# Patient Record
Sex: MALE | Race: WHITE
[De-identification: names, ages, dates, MRNs, and addresses within clinical notes are randomized per-mention and may not be internally consistent; named-entity substitution may affect disease eponyms.]

---

## 2019-10-02 ENCOUNTER — HOSPITAL ENCOUNTER (OUTPATIENT)
Dept: HOSPITAL 25 - ED | Age: 54
Setting detail: OBSERVATION
LOS: 1 days | Discharge: HOME | End: 2019-10-03
Attending: INTERNAL MEDICINE | Admitting: INTERNAL MEDICINE
Payer: COMMERCIAL

## 2019-10-02 ENCOUNTER — HOSPITAL ENCOUNTER (EMERGENCY)
Dept: HOSPITAL 25 - ED | Age: 54
Discharge: HOME | End: 2019-10-02
Payer: COMMERCIAL

## 2019-10-02 VITALS — DIASTOLIC BLOOD PRESSURE: 88 MMHG | SYSTOLIC BLOOD PRESSURE: 149 MMHG

## 2019-10-02 DIAGNOSIS — R07.89: Primary | ICD-10-CM

## 2019-10-02 DIAGNOSIS — R00.2: ICD-10-CM

## 2019-10-02 DIAGNOSIS — E78.00: ICD-10-CM

## 2019-10-02 DIAGNOSIS — R07.9: Primary | ICD-10-CM

## 2019-10-02 DIAGNOSIS — Z79.899: ICD-10-CM

## 2019-10-02 DIAGNOSIS — I25.10: ICD-10-CM

## 2019-10-02 DIAGNOSIS — E78.5: ICD-10-CM

## 2019-10-02 DIAGNOSIS — Z88.8: ICD-10-CM

## 2019-10-02 DIAGNOSIS — I49.49: ICD-10-CM

## 2019-10-02 DIAGNOSIS — R10.9: ICD-10-CM

## 2019-10-02 DIAGNOSIS — Z87.891: ICD-10-CM

## 2019-10-02 DIAGNOSIS — R11.10: ICD-10-CM

## 2019-10-02 LAB
ALBUMIN SERPL BCG-MCNC: 4.9 G/DL (ref 3.2–5.2)
ALBUMIN/GLOB SERPL: 1.9 {RATIO} (ref 1–3)
ALP SERPL-CCNC: 55 U/L (ref 34–104)
ALT SERPL W P-5'-P-CCNC: 22 U/L (ref 7–52)
ANION GAP SERPL CALC-SCNC: 4 MMOL/L (ref 2–11)
AST SERPL-CCNC: 22 U/L (ref 13–39)
BASOPHILS # BLD AUTO: 0 10^3/UL (ref 0–0.2)
BUN SERPL-MCNC: 14 MG/DL (ref 6–24)
BUN/CREAT SERPL: 17.3 (ref 8–20)
CALCIUM SERPL-MCNC: 10 MG/DL (ref 8.6–10.3)
CHLORIDE SERPL-SCNC: 105 MMOL/L (ref 101–111)
CK MB SERPL-MCNC: 1.7 NG/ML (ref 0.6–6.3)
CK MB SERPL-MCNC: 2 NG/ML (ref 0.6–6.3)
CK SERPL-CCNC: 115 U/L (ref 10–223)
EOSINOPHIL # BLD AUTO: 0.1 10^3/UL (ref 0–0.6)
GLOBULIN SER CALC-MCNC: 2.6 G/DL (ref 2–4)
GLUCOSE SERPL-MCNC: 82 MG/DL (ref 70–100)
HCO3 SERPL-SCNC: 30 MMOL/L (ref 22–32)
HCT VFR BLD AUTO: 45 % (ref 42–52)
HGB BLD-MCNC: 15.4 G/DL (ref 14–18)
INR PPP/BLD: 0.97 (ref 0.82–1.09)
LYMPHOCYTES # BLD AUTO: 1.9 10^3/UL (ref 1–4.8)
MAGNESIUM SERPL-MCNC: 2.3 MG/DL (ref 1.9–2.7)
MCH RBC QN AUTO: 32 PG (ref 27–31)
MCHC RBC AUTO-ENTMCNC: 34 G/DL (ref 31–36)
MCV RBC AUTO: 94 FL (ref 80–94)
MONOCYTES # BLD AUTO: 0.6 10^3/UL (ref 0–0.8)
NEUTROPHILS # BLD AUTO: 3.3 10^3/UL (ref 1.5–7.7)
NRBC # BLD AUTO: 0 10^3/UL
NRBC BLD QL AUTO: 0.1
PLATELET # BLD AUTO: 286 10^3/UL (ref 150–450)
POTASSIUM SERPL-SCNC: 4 MMOL/L (ref 3.5–5)
PROT SERPL-MCNC: 7.5 G/DL (ref 6.4–8.9)
RBC # BLD AUTO: 4.81 10^6 /UL (ref 4.18–5.48)
SODIUM SERPL-SCNC: 139 MMOL/L (ref 135–145)
TROPONIN I SERPL-MCNC: 0 NG/ML (ref ?–0.04)
TROPONIN I SERPL-MCNC: 0 NG/ML (ref ?–0.04)
WBC # BLD AUTO: 6 10^3/UL (ref 3.5–10.8)

## 2019-10-02 PROCEDURE — 82553 CREATINE MB FRACTION: CPT

## 2019-10-02 PROCEDURE — 85025 COMPLETE CBC W/AUTO DIFF WBC: CPT

## 2019-10-02 PROCEDURE — 93306 TTE W/DOPPLER COMPLETE: CPT

## 2019-10-02 PROCEDURE — 93017 CV STRESS TEST TRACING ONLY: CPT

## 2019-10-02 PROCEDURE — 80061 LIPID PANEL: CPT

## 2019-10-02 PROCEDURE — 78452 HT MUSCLE IMAGE SPECT MULT: CPT

## 2019-10-02 PROCEDURE — 82550 ASSAY OF CK (CPK): CPT

## 2019-10-02 PROCEDURE — 80048 BASIC METABOLIC PNL TOTAL CA: CPT

## 2019-10-02 PROCEDURE — 90686 IIV4 VACC NO PRSV 0.5 ML IM: CPT

## 2019-10-02 PROCEDURE — 83735 ASSAY OF MAGNESIUM: CPT

## 2019-10-02 PROCEDURE — 36415 COLL VENOUS BLD VENIPUNCTURE: CPT

## 2019-10-02 PROCEDURE — G0008 ADMIN INFLUENZA VIRUS VAC: HCPCS

## 2019-10-02 PROCEDURE — 84484 ASSAY OF TROPONIN QUANT: CPT

## 2019-10-02 PROCEDURE — 85610 PROTHROMBIN TIME: CPT

## 2019-10-02 PROCEDURE — 83880 ASSAY OF NATRIURETIC PEPTIDE: CPT

## 2019-10-02 PROCEDURE — 93005 ELECTROCARDIOGRAM TRACING: CPT

## 2019-10-02 PROCEDURE — 99284 EMERGENCY DEPT VISIT MOD MDM: CPT

## 2019-10-02 PROCEDURE — 90471 IMMUNIZATION ADMIN: CPT

## 2019-10-02 PROCEDURE — 80053 COMPREHEN METABOLIC PANEL: CPT

## 2019-10-02 PROCEDURE — 71046 X-RAY EXAM CHEST 2 VIEWS: CPT

## 2019-10-02 PROCEDURE — 84439 ASSAY OF FREE THYROXINE: CPT

## 2019-10-02 PROCEDURE — 83036 HEMOGLOBIN GLYCOSYLATED A1C: CPT

## 2019-10-02 PROCEDURE — G0378 HOSPITAL OBSERVATION PER HR: HCPCS

## 2019-10-02 PROCEDURE — 84443 ASSAY THYROID STIM HORMONE: CPT

## 2019-10-02 PROCEDURE — 99283 EMERGENCY DEPT VISIT LOW MDM: CPT

## 2019-10-02 PROCEDURE — A9502 TC99M TETROFOSMIN: HCPCS

## 2019-10-02 NOTE — ED
HPI Chest Pain





- HPI Summary


HPI Summary: 


Patient is a 53 y/o M presenting to [facility] for evaluation of chest pain. He was 

evaluated by Dr. Jin earlier today, 10/02/19 and was discharged to home. He 

was called back due to abnormal EKG findings. During the time of the phone call

, he had reported that he had been having chest pressure intermittently still 

that was worse with exertion. While in ED, he reports no chest pain but states 

he has mild abdominal discomfort. Patient to be admitted for in-patient stress 

test. Home medications and allergies are reviewed. 





- History of Current Complaint


Chief Complaint: EDGeneral


Time Seen by Provider: 10/02/19 19:26


Hx Obtained From: Patient


Onset/Duration: Resolved


Timing: Intermittent


Current Severity: None


Pain Intensity: 0


Pain Scale Used: 0-10 Numeric


Associated Signs and Symptoms: Positive: Chest Pain, Abdominal Pain





- Allergy/Home Medications


Allergies/Adverse Reactions: 


 Allergies











Allergy/AdvReac Type Severity Reaction Status Date / Time


 


simvastatin Allergy  Altered Verified 10/02/19 16:24





   Mental  





   Status  











Home Medications: 


 Home Medications





Tadalafil [Cialis] 5 mg PO DAILY PRN 10/02/19 [History Confirmed 10/03/19]











PMH/Surg Hx/FS Hx/Imm Hx


Cardiovascular History: Reports: Hx Hypercholesterolemia, Other Cardiovascular 

Problems/Disorders - ectopic heart beats


Sensory History: 


   Denies: Hx Legally Blind, Hx Deafness


Opthamlomology History: 


   Denies: Hx Legally Blind


Infectious Disease History: No


Infectious Disease History: 


   Denies: Traveled Outside the [place] in Last 30 Days





- Family History


Known Family History: 


   Negative: Blood Disorder





- Social History


Alcohol Use: Rare


Substance Use Type: Reports: None


Smoking Status (MU): Former Smoker





Review of Systems


Positive: Chest Pain


Positive: Abdominal Pain


All Other Systems Reviewed And Are Negative: Yes





Physical Exam





- Summary


Physical Exam Summary: 


VITAL SIGNS: Reviewed.


GENERAL: Patient is a well-developed and nourished MALE who is lying 

comfortable in the stretcher. Patient is not in any acute respiratory distress.


HEAD AND FACE: No signs of trauma. No ecchymosis, hematomas or skull 

depressions. No sinus tenderness.


EYES: PERRLA, EOMI x 2, No injected conjunctiva, no nystagmus.


EARS: Hearing grossly intact. Ear canals and tympanic membranes are within 

normal limits.


MOUTH: Oropharynx within normal limits.


NECK: Supple, trachea is midline, no adenopathy, no JVD, no carotid bruit, no c-

spine tenderness, neck with full ROM.


CHEST: Symmetric, no tenderness at palpation.


LUNGS: Clear to auscultation bilaterally. No wheezing or crackles.


CVS: Regular rate and rhythm, S1 and S2 present, no murmurs or gallops 

appreciated.


ABDOMEN: Soft, non-tender. No signs of distention. No rebound, no guarding, and 

no masses palpated. Bowel sounds are normal.


EXTREMITIES: FROM in all major joints, no edema, no cyanosis or clubbing.


NEURO: Alert and oriented x 3. No acute neurological deficits. Speech is normal 

and follows commands.


SKIN: Dry and warm.








Triage Information Reviewed: Yes


Vital Signs On Initial Exam: 


 Initial Vitals











Temp Pulse Resp BP Pulse Ox


 


 98.4 F   77   18   162/91   99 


 


 10/02/19 16:21  10/02/19 16:21  10/02/19 16:21  10/02/19 16:21  10/02/19 16:21











Vital Signs Reviewed: Yes





Procedures





- Sedation


Patient Received Moderate/Deep Sedation with Procedure: No





Diagnostics





- Vital Signs


 Vital Signs











  Temp Pulse Resp BP Pulse Ox


 


 10/02/19 18:10  98.2 F  74  17  150/86  100


 


 10/02/19 16:21  98.4 F  77  18  162/91  99














- Laboratory


Result Diagrams: 


 10/03/19 05:34





 10/03/19 05:34


Lab Statement: Any lab studies that have been ordered have been reviewed, and 

results considered in the medical decision making process.





- EKG


  ** 2011


Cardiac Rate: NL - rate of 68 BPM


EKG Rhythm: Sinus Rhythm


Summary of EKG Findings: EKG showed NSR with rate of 68 BPM, ST depressions in 

lead I - III aVF, V3-V6. This EKG was reviewed and interpreted by ED physician.





Chest Pain Course/Dx





- Course


Assessment/Plan: This patient is a 54-year-old male who I recalled to return to 

the emergency room since the patients second EKG was abnormal.  The patient 

continues to be asymptomatic, he denies any chest pains and the first 2 

troponins were negative.  The EKG he was a normal sinus rhythm at 81 bpm with 

ST depression in leads 1-3 and aVF and V3 to V6.  The patient was given 

Lopressor and aspirin. I held the Nitroglycerin since he takes Cialis.  I 

discussed the findings and the EKG with Dr. Milton who reported that Dr. Ortiz 

is going to consult for this patient tomorrow morning.  I also discussed case 

with Dr. Godwin from the hospital services who accepted the patient for 

admission.  Repeat troponin 0.00 and CKMB 1.7





- Diagnoses


Provider Diagnoses: 


 Angina pectoris, EKG abnormality








- Provider Notifications


Discussed Care Of Patient With: Deyvi Godwin


Time Discussed With Above Provider: 19:31


Instructed by Provider To: Other - Patient's case was discussed with Dr. Godwin, 

who accepts the patient for admission. Cardiology consult to be obtained. 1934 

- Patient's case was discussed with Dr. Milton, Dr. Milton reports that the 

patient will be evaluated by Dr. Ortiz in the morning.





Discharge ED





- Sign-Out/Discharge


Documenting (check all that apply): Patient Departure - admit





- Discharge Plan


Condition: Stable


Disposition: ADMITTED TO [facility]





- Billing Disposition and Condition


Condition: STABLE


Disposition: Admitted to [facility]





- Attestation Statements


Document Initiated by Jimibe: Yes


Documenting Scribe: JET NARANJO


Provider For Whom Steven is Documenting (Include Credential): CLAUDIA JIN MD


Scribe Attestation: 


I, JET NARANJO, scribed for CLAUDIA JIN MD on 10/04/19 at 0916. 


Scribe Documentation Reviewed: Yes


Provider Attestation: 


The documentation as recorded by the JET mares accurately reflects 

the service I personally performed and the decisions made by me, CLAUDIA JIN MD


Status of Scribe Document: Viewed

## 2019-10-02 NOTE — ED
Complex/Multi-Sys Presentation





- HPI Summary


HPI Summary: 





This patient is a 54 year old M presenting to [facility] with a chief complaint of 

chest pain and palpitations since last night. The Sx lasted for 2 minutes. He 

denies these Sx at present. The patient rates the pain 0/10 in severity. 

Symptoms aggravated by laying down. Symptoms alleviated by standing up.


Patient reports nausea, congestion. Patient denies dizziness, SOB, near-

syncope. Pt has hx of ectopic heart beats and HLD. Pt has not had any previous 

surgeries. He no longer smokes, but drinks alcohol occasionally and uses 

recreational drugs.








- History Of Current Complaint


Chief Complaint: EDChestWallPain


Time Seen by Provider: 10/02/19 11:09


Hx Obtained From: Patient


Onset/Duration: Sudden Onset, Lasting Days - 1


Severity Currently: Mild


Severity Initially: Mild


Aggravating Factor(s): laying down


Alleviating Factor(s): standing up


Associated Signs And Symptoms: Positive: Chest Pain - since resolved, 

Palpitations - since resolved, Nausea, Other - positive - congestion; negative 

- dizziness, near-syncope..  Negative: SOB





- Allergies/Home Medications


Allergies/Adverse Reactions: 


 Allergies











Allergy/AdvReac Type Severity Reaction Status Date / Time


 


simvastatin Allergy  Altered Verified 10/02/19 16:24





   Mental  





   Status  














PMH/Surg Hx/FS Hx/Imm Hx


Cardiovascular History: Reports: Hx Hypercholesterolemia, Other Cardiovascular 

Problems/Disorders - ectopic heart beats


Sensory History: 


   Denies: Hx Legally Blind, Hx Deafness


Opthamlomology History: 


   Denies: Hx Legally Blind


EENT History: 


   Denies: Hx Deafness


Infectious Disease History: No


Infectious Disease History: 


   Denies: Traveled Outside the [place] in Last 30 Days





- Family History


Known Family History: 


   Negative: Blood Disorder





- Social History


Alcohol Use: Rare


Substance Use Type: Reports: None


Smoking Status (MU): Former Smoker





Review of Systems


Positive: Palpitations, Chest Pain


Respiratory: Other - positive - congestion 


Negative: Shortness Of Breath


Positive: Nausea


Positive: Myalgia - torso pain 


Neurological: Other - negative - dizziness 


Negative: Syncope - near


All Other Systems Reviewed And Are Negative: Yes





Physical Exam





- Summary


Physical Exam Summary: 





VITAL SIGNS: Reviewed.


GENERAL: Patient is a well-developed and nourished MALE who is lying 

comfortable in the stretcher. Patient is not in any acute respiratory distress.


HEAD AND FACE: No signs of trauma. No ecchymosis, hematomas or skull 

depressions. No sinus tenderness.


EYES: PERRLA, EOMI x 2, No injected conjunctiva, no nystagmus.


EARS: Hearing grossly intact. Ear canals and tympanic membranes are within 

normal limits.


MOUTH: Oropharynx within normal limits.


NECK: Supple, trachea is midline, no adenopathy, no JVD, no carotid bruit, no c-

spine tenderness, neck with full ROM.


CHEST: Symmetric, no tenderness at palpation.


LUNGS: Clear to auscultation bilaterally. No wheezing or crackles.


CVS: Regular rate and rhythm, S1 and S2 present, no murmurs or gallops 

appreciated.


ABDOMEN: Soft, non-tender. No signs of distention. No rebound, no guarding, and 

no masses palpated. Bowel sounds are normal.


EXTREMITIES: FROM in all major joints, no edema, no cyanosis or clubbing.


NEURO: Alert and oriented x 3. No acute neurological deficits. Speech is normal 

and follows commands.


SKIN: Dry and warm.





Triage Information Reviewed: Yes


Vital Signs On Initial Exam: 


 Initial Vitals











Temp Pulse Resp BP Pulse Ox


 


 98.7 F   84   18   137/75   100 


 


 10/02/19 09:48  10/02/19 09:48  10/02/19 09:48  10/02/19 09:48  10/02/19 09:48











Vital Signs Reviewed: Yes





Procedures





- Sedation


Patient Received Moderate/Deep Sedation with Procedure: No





Diagnostics





- Vital Signs


 Vital Signs











  Temp Pulse Resp BP Pulse Ox


 


 10/02/19 09:48  98.7 F  84  18  137/75  100














- Laboratory


Lab Results: 


 Lab Results











  10/02/19 10/02/19 Range/Units





  11:01 11:01 


 


WBC  6.0   (3.5-10.8)  10^3/uL


 


RBC  4.81   (4.18-5.48)  10^6 /uL


 


Hgb  15.4   (14.0-18.0)  g/dL


 


Hct  45   (42-52)  %


 


MCV  94   (80-94)  fL


 


MCH  32 H   (27-31)  pg


 


MCHC  34   (31-36)  g/dL


 


RDW  13   (10-15)  %


 


Plt Count  286   (150-450)  10^3/uL


 


MPV  8.2   (7.4-10.4)  fL


 


Neut % (Auto)  55.6   %


 


Lymph % (Auto)  32.3   %


 


Mono % (Auto)  9.4   %


 


Eos % (Auto)  2.2   %


 


Baso % (Auto)  0.5   %


 


Absolute Neuts (auto)  3.3   (1.5-7.7)  10^3/ul


 


Absolute Lymphs (auto)  1.9   (1.0-4.8)  10^3/ul


 


Absolute Monos (auto)  0.6   (0-0.8)  10^3/ul


 


Absolute Eos (auto)  0.1   (0-0.6)  10^3/ul


 


Absolute Basos (auto)  0.0   (0-0.2)  10^3/ul


 


Absolute Nucleated RBC  0.0   10^3/ul


 


Nucleated RBC %  0.1   


 


INR (Anticoag Therapy)   0.97  (0.82-1.09)  











Result Diagrams: 


 10/02/19 11:01





 10/02/19 11:01


Lab Statement: Any lab studies that have been ordered have been reviewed, and 

results considered in the medical decision making process.





- Radiology


  ** CXR


Radiology Interpretation Completed By: Radiologist


Summary of Radiographic Findings: IMPRESSION:  #. No evidence for acute 

intrathoracic disease.  THIS REPORT WAS REVIEWED BY DR. JIN.





- EKG


  ** 1119


Cardiac Rate: NL - rate of 69 BPM


EKG Rhythm: Sinus Rhythm


Summary of EKG Findings: EKG showed NSR with rate of 69 BPM, no ST elevation, 

normal axis. EKG has been reviewed and interpreted by ED physician.





Re-Evaluation





- Re-Evaluation


  ** First Eval


Re-Evaluation Time: 13:44


Comment: I discussed all the findings and test results with the patient. 

Patient was instructed to return to the emergency room immediately if any of 

the symptoms return or worsen. Plan of care was discussed with the patient and 

understands and agrees. All questions were answered at patient satisfaction.  

There were no further complaints or concerns.  Lung exam before discharge: CTA B

/L. Good air exchange. No wheezing or crackles heard. CVS: S1 and S2 present. 

No murmurs appreciated. Patient is alert and oriented x 3. Patient is 

hemodynamically stable. Patient will be discharged home with follow up PCP in 

the next 2-3 days





Complex Multi-Symp Course/Dx


Course Of Treatment: This patient is a 54-year-old male who presents to the 

emergency department with a chief complaint of having chest pain and 

palpitations for the last couple weeks.  In the ED the patient reports no 

palpitations or chest pain.  EKG is a sinus rhythm at 69 bpm without any ST 

elevations.  Chest x-ray impression: No evidence for acute intrathoracic 

disease.  Blood work without any significant abnormality except for TSH of 5.6.

  Troponin is 0.00.  The patient continues to be asymptomatic.  Second troponin 

is 0.00.  Heart score is 2 and LU score is 1. Therefore low suspicion for 

ACS.  He is not tachycardic or hypoxic thus low suspicion for PE.  I believe 

that the patient would benefit from Holter monitor.  I discussed all the 

findings and test results with the patient. Patient was instructed to return to 

the emergency room immediately if any of the symptoms return or worsen. Plan of 

care was discussed with the patient and understands and agrees. All questions 

were answered at patient satisfaction.  There were no further complaints or 

concerns.  Lung exam before discharge: CTA B/L. Good air exchange. No wheezing 

or crackles heard. CVS: S1 and S2 present. No murmurs appreciated. Patient is 

alert and oriented x 3. Patient is hemodynamically stable. Patient will be 

discharged home with follow up PCP in the next 2-3 days


Assessment/Plan: Right after the patient was discharged I noticed that the 

patient had another EKG which shows some ST depressions.  Therefore I recall 

the patient to return to the emergency department.  The patient reports that he 

will return in the couple hours.





- Diagnoses


Provider Diagnoses: 


 Atypical chest pain, Palpitations, Chest pain








Discharge ED





- Sign-Out/Discharge


Documenting (check all that apply): Patient Departure - discharge





- Discharge Plan


Condition: Stable


Disposition: HOME


Patient Education Materials:  Chest Pain (ED), Heart Palpitations (ED)


Referrals: 


Jose A Alston MD [Primary Care Provider] - 3 Days


Additional Instructions: 


PLEASE RETURN TO ED FOR ANY NEW OR WORSENING SYMPTOMS. PLEASE FOLLOW UP WITH 

YOUR PRIMARY CARE PHYSICIAN WITHIN THREE DAYS. 





- Billing Disposition and Condition


Condition: STABLE


Disposition: Home





- Attestation Statements


Document Initiated by Scribe: Yes


Documenting Scribe: JET SPARKS


Provider For Whom Scribe is Documenting (Include Credential): CLAUDIA JIN MD


Scribe Attestation: 


JET DIMAS AND DAYANNA SPRAKS, scribed for CLAUDIA JIN MD on 10/04/19 at 

0917. 


Scribe Documentation Reviewed: Yes


Provider Attestation: 


The documentation as recorded by the scribe, JET NARANJO AND DAYANNA SPARKS 

accurately reflects the service I personally performed and the decisions made 

by me, CLAUDIA JIN MD


Status of Scribe Document: Viewed

## 2019-10-03 VITALS — DIASTOLIC BLOOD PRESSURE: 68 MMHG | SYSTOLIC BLOOD PRESSURE: 118 MMHG

## 2019-10-03 LAB
ANION GAP SERPL CALC-SCNC: 8 MMOL/L (ref 2–11)
BASOPHILS # BLD AUTO: 0 10^3/UL (ref 0–0.2)
BUN SERPL-MCNC: 16 MG/DL (ref 6–24)
BUN/CREAT SERPL: 18.2 (ref 8–20)
CALCIUM SERPL-MCNC: 9.3 MG/DL (ref 8.6–10.3)
CHLORIDE SERPL-SCNC: 105 MMOL/L (ref 101–111)
CHOLEST SERPL-MCNC: 263 MG/DL
EOSINOPHIL # BLD AUTO: 0.1 10^3/UL (ref 0–0.6)
GLUCOSE SERPL-MCNC: 92 MG/DL (ref 70–100)
HCO3 SERPL-SCNC: 25 MMOL/L (ref 22–32)
HCT VFR BLD AUTO: 44 % (ref 42–52)
HDLC SERPL-MCNC: 41.1 MG/DL
HGB BLD-MCNC: 14.9 G/DL (ref 14–18)
LYMPHOCYTES # BLD AUTO: 3 10^3/UL (ref 1–4.8)
MCH RBC QN AUTO: 32 PG (ref 27–31)
MCHC RBC AUTO-ENTMCNC: 34 G/DL (ref 31–36)
MCV RBC AUTO: 94 FL (ref 80–94)
MONOCYTES # BLD AUTO: 0.7 10^3/UL (ref 0–0.8)
NEUTROPHILS # BLD AUTO: 3.9 10^3/UL (ref 1.5–7.7)
NRBC # BLD AUTO: 0 10^3/UL
NRBC BLD QL AUTO: 0.1
PLATELET # BLD AUTO: 270 10^3/UL (ref 150–450)
POTASSIUM SERPL-SCNC: 3.5 MMOL/L (ref 3.5–5)
RBC # BLD AUTO: 4.7 10^6 /UL (ref 4.18–5.48)
SODIUM SERPL-SCNC: 138 MMOL/L (ref 135–145)
TRIGL SERPL-MCNC: 147 MG/DL
TROPONIN I SERPL-MCNC: 0.01 NG/ML (ref ?–0.04)
WBC # BLD AUTO: 7.8 10^3/UL (ref 3.5–10.8)

## 2019-10-03 NOTE — ECHO
*[facility]*

[place] [address]

Phone: [phone]

Fax #: [phone]



-------------------------------------------------------------------

Transthoracic Echocardiogram



Patient: Lambert Lomas              MRN:        [id number]

:     1965                         Study Date: 10/03/2019

Age:     54                                 Accession#: [id number]

Gender:  M                                  HR:         53 bpm

Height:  70 in /177.8 cm                    BSA:        1.95 m^2

Weight:  169.6 lb /77.1 kg                  BMI:        24.4 kg/m^2



*Sonographer: * Sherrie Mckinney RDCS RN

 

*Referring Physician: * Deyvi Godwin

*Reading Physician: * Anibal Ortiz MD



-------------------------------------------------------------------

Indications:   Chest Pain, unspecified.



-------------------------------------------------------------------

History:   Risk factors:   Former tobacco use. Hypertension.

Dyslipidemia.



-------------------------------------------------------------------

Conclusions



Summary:



- Left ventricle: The cavity size is normal. Wall thickness is at

  the upper limits of normal. Systolic function is normal. The

  estimated ejection fraction is 55-60%. Wall motion is normal;

  there are no regional wall motion abnormalities.

- Normal cardiac chamber sizes.

- Functionally benign heart valves.

- There is no prior echocardiogram available to compare with at

  this time.



-------------------------------------------------------------------

Study data:  Transthoracic echocardiogram.  Procedure:

Transthoracic echocardiography was performed. Image quality was

fair.  Complete 2D, spectral Doppler, and color flow Doppler.

Location:  Bedside.  Patient status:  Observation. Patient room

number: 444-02.  Rhythm:  Bradycardia.



-------------------------------------------------------------------

Findings



Left ventricle:  The cavity size is normal. Wall thickness is at

the upper limits of normal. Systolic function is normal. The

estimated ejection fraction is 55-60%. Wall motion is normal; there

are no regional wall motion abnormalities. Left ventricular

diastolic function parameters are normal.

Right ventricle:  The cavity size is normal. Systolic function is

normal.

Left atrium:  The atrium is normal in size.

Right atrium:  The atrium is normal in size.

Mitral valve:  The leaflets are mildly thickened. There is trace

regurgitation.

Aortic valve:   The valve is trileaflet. The leaflets are mildly

thickened.  There is no evidence of stenosis.   There is no

regurgitation.

Tricuspid valve:   The valve is structurally normal.   There is

trace regurgitation.

Pulmonic valve:    The valve is structurally normal.    There is no

evidence of stenosis.   There is no regurgitation.

Aorta:  Aortic root: The aortic root is appears normal.

Ascending aorta: The ascending aorta is not dilated.

Aortic arch: The aortic arch is not dilated.

Pericardium:  There is no pericardial effusion.

Pulmonary arteries:

The main pulmonary artery is normal-sized.  Systolic pressure can

not be accurately estimated.

Systemic veins:

Inferior vena cava: The vessel is normal in size. There is (&gt;= 50%)

respiratory change in the IVC dimension.



-------------------------------------------------------------------

Measurements



 Left ventricle            Value        Ref         Aortic valve               Value       Ref

 VANDANA, LAX          (L)     4.0   cm     4.2 - 5.8   Jennifer diam, ED               2.1   cm    ----

 ESD, LAX                  2.9   cm     2.5 - 4.0   Peak v, S                  1.22  m/sec ----

 FS, LAX                   28    %      25 - 43     VTI, S                     28.0  cm    ----

 PW, ED            (H)     1.1   cm     0.6 - 1.0   Mean grad, S               3.0   mm Hg ----

 IVS/PW, ED                0.95         ----------  Peak grad, S               6.0   mm Hg ----

 E&apos;, lat jennifer, TDI          10.3  cm/sec &gt;=10.0      LVOT/AV, VTI ratio         0.69        --
--

 E/e&apos;, lat jennifer,            8            ----------

 TDI                                                Mitral valve               Value       Ref

 E&apos;, med jennifer, TDI          9.4   cm/sec &gt;=7.0       Peak E                     0.86  m/sec --
--

 E/e&apos;, med jennifer,            9            ----------  Peak A                     0.65  m/sec ----

 TDI                                                Decel time                 187   ms    ----

 E&apos;, avg, TDI              9.9   cm/sec ----------  Peak grad, D               3.0   mm Hg ----

 E/e&apos;, avg, TDI            9            &lt;=14        Peak E/A ratio             1.3         --
--

 

 LVOT                      Value        Ref         Pulmonic valve             Value       Ref

 Peak cyndie, S               0.98  m/sec  ----------  Peak v, S                  0.77  m/sec ----

 VTI, S                    19.4  cm     ----------  Peak grad, S               2.0   mm Hg ----

 Mean grad, S              2     mm Hg  ----------

                                                    Aortic root                Value       Ref

 Ventricular septum        Value        Ref         Root diam                  3.2   cm    &lt;4.1

 IVS, ED           (H)     1.1   cm     0.6 - 1.0

                                                    Ascending aorta            Value       Ref

 Right ventricle           Value        Ref         AAo AP diam, S             3.0   cm    ----

 VANDANA, LAX                  2.8   cm     ----------

 VANDANA minor ax, A4C         3.2   cm     1.9 - 3.5   Aortic arch                Value       Ref

 mid                                                Arch diam                  3.0   cm    ----

 

 Left atrium               Value        Ref         Decending aorta            Value       Ref

 AP dim, ES                3.30  cm     3.00 -      Nisreen peak cyndie               0.73  m/sec ----

                                        4.00

 ML dim, A4C               4.1   cm     ----------  Inferior vena cava         Value       Ref

 SI dim, A4C               5.4   cm     ----------  Diam                       1.7   cm    ----

 Vol/bsa, ES, 1-p          25    ml/m^2 12 - 37

 A4C

 Vol/bsa, ES, A/L          26    ml/m^2 16 - 34

 

 Right atrium              Value        Ref

 ML dim, ES, A4C           4.0   cm     2.6 - 4.4

 SI dim, ES, A4C           4.7   cm     3.4 - 5.3

 Estimated RAP             3     mm Hg  ----------

 

Legend:

(L)  and  (H)  luke values outside specified reference range.



Prepared and electronically signed by



Anibal Ortiz MD

10/03/2019 09:25

## 2019-10-03 NOTE — HP
CC:  Dr. Jose A Alston *

 

ADMISSION HISTORY AND PHYSICAL:

 

DATE OF ADMISSION:  10/03/19

 

CHIEF COMPLAINT:  Chest pain.

 

HISTORY OF PRESENT ILLNESS:  This is a 54-year-old male with a past medical 
history of hyperlipidemia; not on statin due to some reaction, history of 
ectopic beat about 4 to 5 years ago, had monitoring done and was told it was 
benign.  He came in for chest pain.  The patient stated that for the last month 
he again had the symptoms of ectopic beats, which usually occur from morning to 
about afternoon, but he did not think much of it.  However, yesterday morning, 
around 3 a.m., he was woken up from his sleep due to severe anterior chest pain
, which went away after he stood up within 2 to 3 minutes and this happened 
twice, so he finally decided to come to the ER for further evaluation.  Both 
times he stated that standing up sort of helped relieve some of the pain.  He 
otherwise denies any accompanying nausea, vomiting, or diarrhea, but did feel 
that he was having congestion in his nose and dry cough.  No accompanying 
shortness of breath.  No other numbness, tingling, or weakness.

 

PAST MEDICAL HISTORY:

1.  As mentioned, hyperlipidemia, for which he used to be on simvastatin, but 
he stopped after he developed some memory issues which were attributed to 
simvastatin use.

2.  He has also had history of ectopic beats, which was evaluated 4 to 5 years 
ago.

 

HOME MEDICATIONS:  The patient is currently only on Cialis p.r.n.

 

ALLERGIES:  As mentioned, SIMVASTATIN causes altered mental status and memory 
issue.

 

FAMILY HISTORY:  Father  at age 86, had history of congestive heart failure
, but no heart attack ever and  from complications from a pituitary gland 
tumor with intracranial bleed.  Mother is alive at age 86; otherwise, healthy.

 

SOCIAL HISTORY:  The patient quit smoking about 10 years ago; prior to that, 
had 15 to 20 years smoking about half to a pack a day.  Still drinks about a 
beer every single day.  Denies any drug use.  He is a clinical psychologist.  
Lives with his wife and 2 kids.

 

REVIEW OF SYSTEMS:  A 14-point review of systems did not reveal any new 
information, other than the ones mentioned in the HPI.

 

                               PHYSICAL EXAMINATION

 

GENERAL:  The patient is awake, alert and oriented x3, does not appear to be in 
any acute distress.

 

VITAL SIGNS:  In the ER, BP was noted to be 146/85, heart rate 61, respiration 
rate 14, saturating 97% on room air, temperature was noted to be 98.2.

 

HEAD AND NECK:  Atraumatic and normocephalic.  Bilateral pupils are reactive.  
Oral mucosa was moist.

 

NECK:  Supple.  No jugular venous distention.

 

LUNGS:  Clear to auscultation bilaterally.  No wheezes, rhonchi, or rales.

 

HEART:  S1, S2.  Regular rate and rhythm.

 

ABDOMEN:  Soft, nontender, and nondistended.

 

EXTREMITIES:  No cyanosis, clubbing, or edema.

 

 DIAGNOSTIC STUDIES/LAB DATA:  CBC was within normal limits.  Coagulation 
profile within normal limits.  Comprehensive metabolic panel was unremarkable.  
Three sets of troponin was within normal limits.  TSH was noted to be minimally 
elevated.

 

EKG:  The very first EKG was showing sinus rhythm at 81 beats per minute with a 
significant ST depression in the anterior chest leads as well as some of the 
limb leads.  A repeat second EKG was noted to be sinus rhythm at 69 beats per 
minute with a significant drop in the ST segment as the first EKG and a third 
EKG was performed which also was within normal limits showing some sinus 
arrhythmia, but otherwise unremarkable for ST segment.

 

IMPRESSION:  This is a 54-year-old gentleman with history of dyslipidemia who 
came in with chest pain.  He was initially discharged home, but after a second 
look by the ER physician regarding the first initial EKG which showed some 
significant ST- segment depression, he was recalled back to the hospital for a 
stress test.  Case was also discussed by the ER physician with Dr. Milton, who 
suggested that Dr. Ortiz would be evaluating the patient in the morning.

 

ASSESSMENT AND PLAN:

1.  Chest pain, rule out acute coronary syndrome.  For now, we will start the 
patient on aspirin therapy daily and we will get an echocardiogram and stress 
test in the morning and consider further medicine based on cardiology 
recommendations.

2.  Elevated TSH.  We will get a free T4 level for the morning.  This could be 
just acute sick euthyroid syndrome.

3.  History of dyslipidemia.  We will check a lipid panel to risk stratify the 
patient along with A1c and consider starting a statin with low risk of any 
reaction such as pravastatin.

4.  Elevated blood pressure, could be related to his pain.  We will monitor 
blood pressure for now and consider starting blood pressure medications 
accordingly.

5.  DVT prophylaxis.  Encourage early ambulation.

 

 

 

[id number]/[facility] #: [id number]

SEJAL

## 2019-10-04 NOTE — DS
CC:  Dr. Alston *

 

DISCHARGE SUMMARY:

 

DATE OF ADMISSION:  10/03/19

 

DATE OF DISCHARGE:  10/03/19

 

ATTENDING PHYSICIAN WHILE IN THE HOSPITAL:  Dr. Prosper Mcconnell * (dictated by 
MASTER [place]).

 

PRIMARY CARE PROVIDER:  Dr. Alston.

 

PRIMARY DIAGNOSIS:  Chest pain, unclear etiology, acute coronary syndrome ruled 
out.

 

SECONDARY DIAGNOSES:

1.  Hyperlipidemia.

2.  History of ectopic beats.

 

STUDIES WHILE IN THE HOSPITAL:

1.  Exercise stress test with Cardiology was nondiagnostic.

2.  Nuclear medicine scan, low risk.  No evidence for stress-induced myocardial 
ischemia or presence of infarct and normal left ventricular wall motion 
ejection fraction.

3.  Transthoracic echocardiogram, ejection fraction 55% to 60%, wall motion is 
normal; there are no regional wall motion abnormalities, functionally benign 
heart valves.

 

PERTINENT LABORATORY DATA:  Troponins negative x3.  .

 

HISTORY OF PRESENT ILLNESS/HOSPITAL COURSE:  Lambert Lomas is a 54-year-old 
white male with past medical history significant for hyperlipidemia and remote 
history of ectopic beats, who presented to the emergency department on 10/02/19
, was then discharged, and then subsequently ask to return to the emergency 
department based on EKG with ST depressions.  The patient was then admitted to 
the hospital for observation.  His initial EKG did have ST depressions at the 
septal leads, which did later resolve. ST depressions were present during his 
exercise component of his stress test and therefore made this test nondiagnostic
, but his stress test via radiology portion was low risk. The transthoracic 
echocardiogram had no evidence of ischemia or wall motion abnormalities.  For 
further information regarding the the patient 's presentation, please see 
admitting history and physical written by Dr. Deyvi Godwin, but in brief, the 
patient awoke in the night with sudden chest pain, which lasted only 2 to 3 
minutes and then was subsequently resolved.  The patient no longer had any 
chest pain during the entirety of hospital stay.  The patient did have VT 
shortening on his EKG, though there were no events on his telemetry during his 
hospital stay.  He and I discussed his elevated LDL and his previous adverse 
reaction to simvastatin.  On day of discharge, the patient feels well, no chest 
pain, no difficulty breathing or abdominal pain.  No neck pain, jaw pain, or 
arm pain.

 

PHYSICAL EXAM ON DAY OF DISCHARGE:  General:  Well-developed, well-nourished, 
middle-aged white male, lying upright in hospital bed, appearing comfortable 
and in no acute distress.  Eyes:  PERRLA.  Sclerae anicteric.  ENT:  Mucous 
membranes moist.  Neck:  Supple without JVD.  Lungs:  Clear to auscultation 
throughout. Cardio:  Regular rate and rhythm without murmurs, rubs, or gallops.
  Abdomen: Soft, nontender, nondistended.  Extremities:  No clubbing, cyanosis, 
or edema. Neuro:  The patient is alert and oriented x3.  No focal deficits.

 

DISCHARGE PLAN:  

Diet:  Regular unrestricted diet.

 

Activity:  The patient may return to normal activity as tolerated.

 

The patient is advised to start a statin though this may be discussed with his 
primary care provider in followup. Though he did have an adverse reaction to 
simvastatin in the past, this does not mean that he would have this reaction to 
any of the other statins, additionally though we did discuss niacin as an 
option he plans to discuss this with Dr. Alston. He is advised to follow up 
with his primary care doctor, Dr. Alston, within a week.   Additionally, he is 
provided with information to reach out to the cardiology office and he should 
have followup with cardiologist outpatient to review his EKGs further and 
perhaps set him up with outpatient cardiac monitoring such as a Holter or a 
longer event monitor.

 

The patient was advised to report to the emergency department if he experiences 
chest pain or difficulty breathing, neck or jaw pain, arm pain or loss of 
consciousness especially within hours of use of Cialis.

 

DISCHARGE MEDICATIONS:  No new medications.

 

Continued home medications:  Cialis 5 mg p.o. daily p.r.n. erectile dysfunction.

 

CONDITION ON DISCHARGE:  Stable.

 

DISPOSITION:  Home.

 

TIME SPENT:  Approximately 45 minutes was spent on this discharge, 
approximately half of this time was spent at the bedside evaluating the patient 
and discussing the plan for care.

 

____________________________________ MASTER [place]

 

[id number]/[facility] #: [id number]

MTDD